# Patient Record
Sex: FEMALE | Race: WHITE | Employment: OTHER | URBAN - NONMETROPOLITAN AREA
[De-identification: names, ages, dates, MRNs, and addresses within clinical notes are randomized per-mention and may not be internally consistent; named-entity substitution may affect disease eponyms.]

---

## 2017-03-27 PROBLEM — D62 ACUTE BLOOD LOSS ANEMIA: Status: ACTIVE | Noted: 2017-03-27

## 2018-04-20 ENCOUNTER — HOSPITAL ENCOUNTER (OUTPATIENT)
Dept: WOMENS IMAGING | Age: 78
Discharge: HOME | End: 2018-04-20
Payer: MEDICARE

## 2018-04-20 ENCOUNTER — APPOINTMENT (OUTPATIENT)
Dept: WOMENS IMAGING | Age: 78
End: 2018-04-20
Payer: MEDICARE

## 2018-04-20 DIAGNOSIS — Z12.31 VISIT FOR SCREENING MAMMOGRAM: ICD-10-CM

## 2018-04-20 DIAGNOSIS — M81.0 AGE-RELATED OSTEOPOROSIS WITHOUT CURRENT PATHOLOGICAL FRACTURE: ICD-10-CM

## 2018-04-20 PROCEDURE — 77063 BREAST TOMOSYNTHESIS BI: CPT

## 2018-04-20 PROCEDURE — 77080 DXA BONE DENSITY AXIAL: CPT

## 2018-04-27 ENCOUNTER — HOSPITAL ENCOUNTER (OUTPATIENT)
Dept: WOMENS IMAGING | Age: 78
Discharge: HOME | End: 2018-04-27
Payer: MEDICARE

## 2018-04-27 DIAGNOSIS — N63.10 BREAST MASS, RIGHT: ICD-10-CM

## 2018-04-27 PROCEDURE — 76642 ULTRASOUND BREAST LIMITED: CPT

## 2020-11-24 ENCOUNTER — HOSPITAL ENCOUNTER (OUTPATIENT)
Dept: WOMENS IMAGING | Age: 80
Discharge: HOME | End: 2020-11-24
Payer: MEDICARE

## 2020-11-24 PROCEDURE — 77063 BREAST TOMOSYNTHESIS BI: CPT

## 2020-11-24 PROCEDURE — 77080 DXA BONE DENSITY AXIAL: CPT

## 2021-02-23 ENCOUNTER — HOSPITAL ENCOUNTER (OUTPATIENT)
Age: 81
Discharge: HOME | End: 2021-02-23
Payer: MEDICARE

## 2021-02-23 LAB
ANION GAP SERPL CALCULATED.3IONS-SCNC: 7 MEQ/L (ref 8–16)
BUN BLDV-MCNC: 20 MG/DL (ref 7–22)
CALCIUM SERPL-MCNC: 9.3 MG/DL (ref 8.5–10.5)
CHLORIDE BLD-SCNC: 105 MEQ/L (ref 98–111)
CO2: 28 MEQ/L (ref 23–33)
CREAT SERPL-MCNC: 0.6 MG/DL (ref 0.4–1.2)
EKG ATRIAL RATE: 71 BPM
EKG P AXIS: 79 DEGREES
EKG P-R INTERVAL: 182 MS
EKG Q-T INTERVAL: 380 MS
EKG QRS DURATION: 78 MS
EKG QTC CALCULATION (BAZETT): 412 MS
EKG R AXIS: 80 DEGREES
EKG T AXIS: 73 DEGREES
EKG VENTRICULAR RATE: 71 BPM
ERYTHROCYTE [DISTWIDTH] IN BLOOD BY AUTOMATED COUNT: 12.8 % (ref 11.5–14.5)
ERYTHROCYTE [DISTWIDTH] IN BLOOD BY AUTOMATED COUNT: 45.6 FL (ref 35–45)
GFR SERPL CREATININE-BSD FRML MDRD: > 90 ML/MIN/1.73M2
GLUCOSE BLD-MCNC: 80 MG/DL (ref 70–108)
HCT VFR BLD CALC: 45.3 % (ref 37–47)
HEMOGLOBIN: 14.5 GM/DL (ref 12–16)
MCH RBC QN AUTO: 31.5 PG (ref 26–33)
MCHC RBC AUTO-ENTMCNC: 32 GM/DL (ref 32.2–35.5)
MCV RBC AUTO: 98.5 FL (ref 81–99)
PLATELET # BLD: 231 THOU/MM3 (ref 130–400)
PMV BLD AUTO: 10.4 FL (ref 9.4–12.4)
POTASSIUM SERPL-SCNC: 4.3 MEQ/L (ref 3.5–5.2)
RBC # BLD: 4.6 MILL/MM3 (ref 4.2–5.4)
SODIUM BLD-SCNC: 140 MEQ/L (ref 135–145)
WBC # BLD: 5.1 THOU/MM3 (ref 4.8–10.8)

## 2021-02-23 PROCEDURE — 36415 COLL VENOUS BLD VENIPUNCTURE: CPT

## 2021-02-23 PROCEDURE — 85027 COMPLETE CBC AUTOMATED: CPT

## 2021-02-23 PROCEDURE — 93010 ELECTROCARDIOGRAM REPORT: CPT | Performed by: NUCLEAR MEDICINE

## 2021-02-23 PROCEDURE — 80048 BASIC METABOLIC PNL TOTAL CA: CPT

## 2021-02-23 PROCEDURE — 93005 ELECTROCARDIOGRAM TRACING: CPT | Performed by: SPECIALIST

## 2021-02-25 NOTE — PROGRESS NOTES
NPO after midnight except sip of water with heart/BP meds  Follow all instructions given by surgeon including medications to hold  Bring insurance card and photo ID  Shower the night before or morning of procedure with liquid antibacterial soap  Wear comfortable clothing  Do not bring jewelry or valuables  Bring list of medications with dosage and how often taken if not reviewed   needed at discharge at least 25years old  Call PAT at [phone] for questions    Instructed to call surgery center at [phone] upon arrival to speak with  before entering building. Covid screen due  at [facility] 6 to 7 days before procedure. Pt plans to have completed on 2/26/21.     Covid screening questionnaire complete and negative for symptoms or exposure see chart for documentation

## 2021-02-26 ENCOUNTER — HOSPITAL ENCOUNTER (OUTPATIENT)
Age: 81
Discharge: HOME | End: 2021-02-26
Payer: MEDICARE

## 2021-02-26 PROCEDURE — U0003 INFECTIOUS AGENT DETECTION BY NUCLEIC ACID (DNA OR RNA); SEVERE ACUTE RESPIRATORY SYNDROME CORONAVIRUS 2 (SARS-COV-2) (CORONAVIRUS DISEASE [COVID-19]), AMPLIFIED PROBE TECHNIQUE, MAKING USE OF HIGH THROUGHPUT TECHNOLOGIES AS DESCRIBED BY CMS-2020-01-R: HCPCS

## 2021-02-27 LAB — SARS-COV-2: NOT DETECTED

## 2021-03-05 ENCOUNTER — ANESTHESIA (OUTPATIENT)
Dept: OPERATING ROOM | Age: 81
End: 2021-03-05
Payer: MEDICARE

## 2021-03-05 ENCOUNTER — HOSPITAL ENCOUNTER (OUTPATIENT)
Age: 81
Setting detail: OUTPATIENT SURGERY
Discharge: HOME | End: 2021-03-05
Attending: SPECIALIST | Admitting: SPECIALIST
Payer: MEDICARE

## 2021-03-05 ENCOUNTER — ANESTHESIA EVENT (OUTPATIENT)
Dept: OPERATING ROOM | Age: 81
End: 2021-03-05
Payer: MEDICARE

## 2021-03-05 VITALS
HEIGHT: 60 IN | SYSTOLIC BLOOD PRESSURE: 195 MMHG | BODY MASS INDEX: 22.58 KG/M2 | DIASTOLIC BLOOD PRESSURE: 92 MMHG | TEMPERATURE: 97.3 F | OXYGEN SATURATION: 95 % | HEART RATE: 73 BPM | WEIGHT: 115 LBS | RESPIRATION RATE: 15 BRPM

## 2021-03-05 VITALS
OXYGEN SATURATION: 96 % | SYSTOLIC BLOOD PRESSURE: 168 MMHG | RESPIRATION RATE: 14 BRPM | DIASTOLIC BLOOD PRESSURE: 80 MMHG

## 2021-03-05 PROCEDURE — 3600000012 HC SURGERY LEVEL 2 ADDTL 15MIN: Performed by: SPECIALIST

## 2021-03-05 PROCEDURE — 7100000011 HC PHASE II RECOVERY - ADDTL 15 MIN: Performed by: SPECIALIST

## 2021-03-05 PROCEDURE — 6360000002 HC RX W HCPCS: Performed by: NURSE ANESTHETIST, CERTIFIED REGISTERED

## 2021-03-05 PROCEDURE — 6360000002 HC RX W HCPCS: Performed by: SPECIALIST

## 2021-03-05 PROCEDURE — 3600000002 HC SURGERY LEVEL 2 BASE: Performed by: SPECIALIST

## 2021-03-05 PROCEDURE — 2500000003 HC RX 250 WO HCPCS: Performed by: NURSE ANESTHETIST, CERTIFIED REGISTERED

## 2021-03-05 PROCEDURE — 2709999900 HC NON-CHARGEABLE SUPPLY: Performed by: SPECIALIST

## 2021-03-05 PROCEDURE — 3700000000 HC ANESTHESIA ATTENDED CARE: Performed by: SPECIALIST

## 2021-03-05 PROCEDURE — 2580000003 HC RX 258: Performed by: SPECIALIST

## 2021-03-05 PROCEDURE — 2500000003 HC RX 250 WO HCPCS: Performed by: SPECIALIST

## 2021-03-05 PROCEDURE — 3700000001 HC ADD 15 MINUTES (ANESTHESIA): Performed by: SPECIALIST

## 2021-03-05 PROCEDURE — 7100000010 HC PHASE II RECOVERY - FIRST 15 MIN: Performed by: SPECIALIST

## 2021-03-05 RX ORDER — FENTANYL CITRATE 50 UG/ML
INJECTION, SOLUTION INTRAMUSCULAR; INTRAVENOUS PRN
Status: DISCONTINUED | OUTPATIENT
Start: 2021-03-05 | End: 2021-03-05 | Stop reason: SDUPTHER

## 2021-03-05 RX ORDER — PROPOFOL 10 MG/ML
INJECTION, EMULSION INTRAVENOUS PRN
Status: DISCONTINUED | OUTPATIENT
Start: 2021-03-05 | End: 2021-03-05 | Stop reason: SDUPTHER

## 2021-03-05 RX ORDER — CEFADROXIL 500 MG/1
500 CAPSULE ORAL 2 TIMES DAILY
COMMUNITY

## 2021-03-05 RX ORDER — LIDOCAINE HYDROCHLORIDE 20 MG/ML
INJECTION, SOLUTION EPIDURAL; INFILTRATION; INTRACAUDAL; PERINEURAL PRN
Status: DISCONTINUED | OUTPATIENT
Start: 2021-03-05 | End: 2021-03-05 | Stop reason: SDUPTHER

## 2021-03-05 RX ORDER — LIDOCAINE HYDROCHLORIDE AND EPINEPHRINE 20; 5 MG/ML; UG/ML
INJECTION, SOLUTION EPIDURAL; INFILTRATION; INTRACAUDAL; PERINEURAL PRN
Status: DISCONTINUED | OUTPATIENT
Start: 2021-03-05 | End: 2021-03-05 | Stop reason: ALTCHOICE

## 2021-03-05 RX ORDER — SODIUM CHLORIDE 9 MG/ML
INJECTION, SOLUTION INTRAVENOUS CONTINUOUS
Status: DISCONTINUED | OUTPATIENT
Start: 2021-03-05 | End: 2021-03-05 | Stop reason: HOSPADM

## 2021-03-05 RX ADMIN — FENTANYL CITRATE 25 MCG: 50 INJECTION, SOLUTION INTRAMUSCULAR; INTRAVENOUS at 10:14

## 2021-03-05 RX ADMIN — LIDOCAINE HYDROCHLORIDE 60 MG: 20 INJECTION, SOLUTION EPIDURAL; INFILTRATION; INTRACAUDAL; PERINEURAL at 10:03

## 2021-03-05 RX ADMIN — CEFAZOLIN 2 G: 10 INJECTION, POWDER, FOR SOLUTION INTRAVENOUS at 10:06

## 2021-03-05 RX ADMIN — SODIUM CHLORIDE: 9 INJECTION, SOLUTION INTRAVENOUS at 09:59

## 2021-03-05 RX ADMIN — FENTANYL CITRATE 25 MCG: 50 INJECTION, SOLUTION INTRAMUSCULAR; INTRAVENOUS at 10:22

## 2021-03-05 RX ADMIN — PROPOFOL 30 MG: 10 INJECTION, EMULSION INTRAVENOUS at 10:04

## 2021-03-05 RX ADMIN — FENTANYL CITRATE 25 MCG: 50 INJECTION, SOLUTION INTRAMUSCULAR; INTRAVENOUS at 10:19

## 2021-03-05 RX ADMIN — FENTANYL CITRATE 25 MCG: 50 INJECTION, SOLUTION INTRAMUSCULAR; INTRAVENOUS at 10:05

## 2021-03-05 ASSESSMENT — PAIN SCALES - GENERAL: PAINLEVEL_OUTOF10: 0

## 2021-03-05 NOTE — H&P
[address] [address]  History and Physical Update    Pt Name: Bartolome Diaz  MRN: [id number]  YOB: 1940  Date of evaluation: 3/5/2021    I have examined the patient and reviewed the H&P/Consult and there are no changes to the patient or plans.       Tali Delgado  Electronically signed 3/5/2021 at 6:45 AM

## 2021-03-05 NOTE — ANESTHESIA POSTPROCEDURE EVALUATION
Department of Anesthesiology  Postprocedure Note    Patient: Simeon Meredith  MRN: [id number]  YOB: 1940  Date of evaluation: 3/5/2021  Time:  1:19 PM     Procedure Summary     Date: 03/05/21 Room / Location: [address] [place] [address]    Anesthesia Start: 2290 Anesthesia Stop: 5461    Procedure: MOHS DEFECT REPAIR BCC MEDIAL UPPER FOREHEAD WITH FLAP CLOSURE (N/A ) Diagnosis: ([id number])    Surgeons: Myra Phelps MD Responsible Provider: Edi Mercado MD    Anesthesia Type: general ASA Status: 2          Anesthesia Type: general    Leidy Phase I:      Leidy Phase II: Leidy Score: 10    Last vitals: Reviewed and per EMR flowsheets. Anesthesia Post Evaluation    Patient location during evaluation: bedside  Patient participation: complete - patient participated  Level of consciousness: awake and alert  Airway patency: patent  Nausea & Vomiting: no nausea and no vomiting  Complications: no  Cardiovascular status: hemodynamically stable  Respiratory status: acceptable  Hydration status: euvolemic      [facility]  POST-ANESTHESIA NOTE       Name:  Simeon Meredith                                         Age:  79 y.o.   MRN:  [id number]      Last Vitals:  BP (!) 195/92   Pulse 73   Temp 97.3 °F (36.3 °C) (Temporal)   Resp 15   Ht 5' (1.524 m)   Wt 115 lb (52.2 kg)   SpO2 95%   BMI 22.46 kg/m²   Patient Vitals for the past 4 hrs:   BP Temp Temp src Pulse Resp SpO2 Height Weight   03/05/21 1047 (!) 195/92 97.3 °F (36.3 °C) Temporal 73 15 95 %     03/05/21 0937 (!) 192/84 96.5 °F (35.8 °C) Temporal 75 16 98 % 5' (1.524 m) 115 lb (52.2 kg)       Level of Consciousness:  Awake    Respiratory:  Stable    Oxygen Saturation:  Stable    Cardiovascular:  Stable    Hydration:  Adequate    PONV:  Stable    Post-op Pain:  Adequate analgesia    Post-op Assessment:  No apparent anesthetic complications Additional Follow-Up / Treatment / Comment:  Francisco Brown MD  March 5, 2021   1:19 PM

## 2021-03-05 NOTE — OP NOTE
Operative Note    Patient name: Elida Killian             Medical Record Number: [id number]    Primary Care Physician: Shirley Hernández MD     1940    Date of Procedure: 3/5/2021    Pre-operative Diagnosis: 6cm2 defect of medial upper forehead s/p MOHS for basal cell carcinoma    Post-operative Diagnosis: Same    Procedure Performed: Repair of medial upper forehead defect with an adjacent tissue transfer (20 cm2) (CPT 35087)    Surgeons/Assistants: Dr. Desi Arguello PA-C/Deysi Phillip DPM    Estimated Blood Loss: 5ml     Complications: none immediately appreciated    Procedure: With the patient lying in the supine position and under adequate anesthesia per the anesthesia team, the area was anesthetized with a total of 19 ml of 1% Lidocaine 1:100,000 with epinephrine solution. The area was then prepped and draped in the standard surgical fashion. There was a very sizeable defect, which could not be closed primarily. Therefore, an adjacent tissue transfer (O to T rotation flap) was then designed, elevated and inset with 4-0 Monocryl suture placed in interrupted buried fashion. The Burrow's triangles were resected to prevent dog-ear deformity and final closure was completed using skin staples, benzoin/steristrips with bulky sterile headwrap. The patient tolerated the procedure quite well and remained hemodynamically stable throughout the procedure and was quite comfortable throughout the operative course.     Clinical staging for cancer cases:  Ct  Cn  Cm    Alvin Chaidez  Electronically signed by me on 3/5/2021 at 11:09 AM  Operative Note      Patient: Elida Killian  YOB: 1940  MRN: [id number]    Date of Procedure: 3/5/2021    Pre-Op Diagnosis: BCC MEDIAL UPPER FOREHEAD    Post-Op Diagnosis: Same       Procedure(s):  MOHS DEFECT REPAIR BCC MEDIAL UPPER FOREHEAD WITH FLAP CLOSURE    Surgeon(s):  Alvin Chaidez MD    Assistant:   Physician Assistant: Joselin Lucas

## 2021-03-05 NOTE — ANESTHESIA PRE PROCEDURE
Department of Anesthesiology  Preprocedure Note       Name:  Evelia Bridges   Age:  79 y.o.  :  1940                                          MRN:  [id number]         Date:  3/5/2021      Surgeon: Lashell Reyes):  Roger Hester MD    Procedure: Procedure(s):  MOHS DEFECT REPAIR BCC MEDIAL UPPER FOREHEAD    Medications prior to admission:   Prior to Admission medications    Medication Sig Start Date End Date Taking? Authorizing Provider   cefadroxil (DURICEF) 500 MG capsule Take 500 mg by mouth 2 times daily   Yes Historical Provider, MD   levothyroxine (SYNTHROID) 75 MCG tablet Take 75 mcg by mouth every morning (before breakfast)   Yes Historical Provider, MD   aspirin-acetaminophen-caffeine (EXCEDRIN MIGRAINE) 250-250-65 MG per tablet Take 1 tablet by mouth every 6 hours as needed for Headaches   Yes Historical Provider, MD   propranolol (INDERAL) 20 MG tablet Take 20 mg by mouth 2 times daily. Yes Historical Provider, MD   Calcium Carb-Cholecalciferol 600-500 MG-UNIT CAPS Take 1 capsule by mouth daily    Historical Provider, MD       Current medications:    Current Facility-Administered Medications   Medication Dose Route Frequency Provider Last Rate Last Admin    ceFAZolin (ANCEF) 2000 mg in dextrose 5 % 50 mL IVPB  2,000 mg Intravenous Once Roger Hester MD        0.9 % sodium chloride infusion   Intravenous Continuous Roger Hester MD           Allergies:     Allergies   Allergen Reactions    Morphine      Sick after surgery       Problem List:    Patient Active Problem List   Diagnosis Code    Mass of neck R22.1    Cerumen impaction H61.20    Hypertrophy of nasal turbinates J34.3    Nasal septal deviation J34.2    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    Thyroid enlargement E04.9    Thyroid mass E07.9    Tachycardia R00.0    S/P total thyroidectomy E89.0    Hypocalcemia E83.51    Postoperative nausea and vomiting R11.2, Z98.890    Iatrogenic hypothyroidism 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.2  Status post total thyroidectomy E89.0    Microcytic anemia D50.9    Fatigue R53.83    Iron deficiency anemia D50.9    Postoperative hypothyroidism E89.0    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    Acute superficial gastritis without hemorrhage K29.00    Hiatal hernia K44.9    Diverticulosis of large intestine without hemorrhage K57.30       Past Medical History:        Diagnosis Date    Arthritis     Cancer ([address]     skin    Malignant hyperthermia     Tachycardia     Thyroid disease        Past Surgical History:        Procedure Laterality Date    CYST REMOVAL      oavery    DILATION AND CURETTAGE OF UTERUS      FINE NEEDLE ASPIRATION      TONSILLECTOMY      TOTAL THYROIDECTOMY  03/08/13    Dr Jaci Gomez    WISDOM TOOTH EXTRACTION         Social History:    Social History     Tobacco Use    Smoking status: Never Smoker    Smokeless tobacco: Never Used   Substance Use Topics    Alcohol use: No                                Counseling given: Not Answered      Vital Signs (Current):   Vitals:    02/25/21 1328 03/05/21 0937   BP:  (!) 192/84   Pulse:  75   Resp:  16   Temp:  96.5 °F (35.8 °C)   TempSrc:  Temporal   SpO2:  98%   Weight: 113 lb (51.3 kg) 115 lb (52.2 kg)   Height: 5' (1.524 m) 5' (1.524 m)                                              BP Readings from Last 3 Encounters:   03/05/21 (!) 192/84   03/29/17 (!) 161/70   06/13/13 110/66       NPO Status: Time of last liquid consumption: 2359                        Time of last solid consumption: 2359                        Date of last liquid consumption: 03/04/21                        Date of last solid food consumption: 03/04/21    BMI:   Wt Readings from Last 3 Encounters:   03/05/21 115 lb (52.2 kg)   03/27/17 116 lb 9.6 oz (52.9 kg)   06/13/13 113 lb (51.3 kg)     Body mass index is 22.46 kg/m².     CBC:   Lab Results   Component Value Date    WBC 5.1 02/23/2021    RBC 4.60 02/23/2021    HGB 14.5 02/23/2021    HCT 45.3 02/23/2021    MCV 98.5 02/23/2021 RDW 22.6 03/29/2017     02/23/2021       CMP:   Lab Results   Component Value Date     02/23/2021    K 4.3 02/23/2021     02/23/2021    CO2 28 02/23/2021    BUN 20 02/23/2021    CREATININE 0.6 02/23/2021    AGRATIO 2.0 05/29/2020    LABGLOM >90 02/23/2021    GLUCOSE 80 02/23/2021    GLUCOSE 99 05/29/2020    PROT 6.6 05/29/2020    CALCIUM 9.3 02/23/2021    BILITOT 0.7 05/29/2020    ALKPHOS 53 05/29/2020    AST 18 05/29/2020    ALT 13 05/29/2020       POC Tests: No results for input(s): POCGLU, POCNA, POCK, POCCL, POCBUN, POCHEMO, POCHCT in the last 72 hours. Coags:   Lab Results   Component Value Date    INR 0.98 03/27/2017    APTT 25.7 03/27/2017       HCG (If Applicable): No results found for: PREGTESTUR, PREGSERUM, HCG, HCGQUANT     ABGs: No results found for: PHART, PO2ART, KXJ3NFF, RAT4WFW, BEART, I3XIJEWO     Type & Screen (If Applicable):  Lab Results   Component Value Date    LABRH POS 03/27/2017       Drug/Infectious Status (If Applicable):  No results found for: HIV, HEPCAB    COVID-19 Screening (If Applicable):   Lab Results   Component Value Date    COVID19 Not Detected 02/26/2021         Anesthesia Evaluation  Patient summary reviewed   history of anesthetic complications: PONV. Airway: Mallampati: II  TM distance: >3 FB   Neck ROM: full  Mouth opening: > = 3 FB Dental: normal exam         Pulmonary:normal exam                               Cardiovascular:                      Neuro/Psych:               GI/Hepatic/Renal:   (+) hiatal hernia,           Endo/Other:    (+) hypothyroidism::., .                 Abdominal:           Vascular:                                        Anesthesia Plan      general     ASA 2       Induction: intravenous. Anesthetic plan and risks discussed with patient.       Plan discussed with CRNA and surgical team.                  Ajay Alvarado MD   3/5/2021

## 2023-03-09 ENCOUNTER — HOSPITAL ENCOUNTER (OUTPATIENT)
Dept: WOMENS IMAGING | Age: 83
Discharge: HOME | End: 2023-03-09
Payer: MEDICARE

## 2023-03-09 DIAGNOSIS — N95.9 MENOPAUSAL DISORDER: ICD-10-CM

## 2023-03-09 DIAGNOSIS — Z12.31 SCREENING MAMMOGRAM FOR HIGH-RISK PATIENT: ICD-10-CM

## 2023-03-09 PROCEDURE — 77080 DXA BONE DENSITY AXIAL: CPT

## 2023-03-09 PROCEDURE — 77063 BREAST TOMOSYNTHESIS BI: CPT

## (undated) DEVICE — STERILE COTTON BALLS LARGE 5/P: Brand: MEDLINE

## (undated) DEVICE — GLOVE SURG SZ 8 L11.77IN FNGR THK9.8MIL STRW LTX POLYMER

## (undated) DEVICE — SOLUTION IV 1000ML 0.9% SOD CHL PH 5 INJ USP VIAFLX PLAS

## (undated) DEVICE — PACK PROCEDURE SURG PLAS SC MIN SRHP LF

## (undated) DEVICE — GLOVE ORANGE PI 7   MSG9070

## (undated) DEVICE — SPONGE GZ W4XL4IN COT 12 PLY TYP VII WVN C FLD DSGN